# Patient Record
Sex: MALE | Race: WHITE | URBAN - METROPOLITAN AREA
[De-identification: names, ages, dates, MRNs, and addresses within clinical notes are randomized per-mention and may not be internally consistent; named-entity substitution may affect disease eponyms.]

---

## 2017-01-10 ENCOUNTER — HISTORICAL (OUTPATIENT)
Dept: RADIATION THERAPY | Facility: HOSPITAL | Age: 62
End: 2017-01-10

## 2017-07-11 ENCOUNTER — HISTORICAL (OUTPATIENT)
Dept: RADIATION THERAPY | Facility: HOSPITAL | Age: 62
End: 2017-07-11

## 2018-07-12 ENCOUNTER — HISTORICAL (OUTPATIENT)
Dept: RADIATION THERAPY | Facility: HOSPITAL | Age: 63
End: 2018-07-12

## 2019-06-21 ENCOUNTER — HOSPITAL ENCOUNTER (OUTPATIENT)
Dept: MEDSURG UNIT | Facility: HOSPITAL | Age: 64
End: 2019-06-22
Attending: INTERNAL MEDICINE | Admitting: INTERNAL MEDICINE

## 2019-06-21 LAB
ABS NEUT (OLG): 3.74 X10(3)/MCL (ref 2.1–9.2)
ALBUMIN SERPL-MCNC: 3.5 GM/DL (ref 3.4–5)
ALBUMIN/GLOB SERPL: 1.1 {RATIO}
ALP SERPL-CCNC: 103 UNIT/L (ref 50–136)
ALT SERPL-CCNC: 35 UNIT/L (ref 12–78)
AMPHET UR QL SCN: NORMAL
ANISOCYTOSIS BLD QL SMEAR: 2
AST SERPL-CCNC: 20 UNIT/L (ref 15–37)
BARBITURATE SCN PRESENT UR: NORMAL
BASOPHILS # BLD AUTO: 0 X10(3)/MCL (ref 0–0.2)
BASOPHILS NFR BLD AUTO: 1 %
BENZODIAZ UR QL SCN: NORMAL
BILIRUB SERPL-MCNC: 0.5 MG/DL (ref 0.2–1)
BILIRUBIN DIRECT+TOT PNL SERPL-MCNC: 0.1 MG/DL (ref 0–0.2)
BILIRUBIN DIRECT+TOT PNL SERPL-MCNC: 0.4 MG/DL (ref 0–0.8)
BUN SERPL-MCNC: 21 MG/DL (ref 7–18)
BURR CELLS BLD QL SMEAR: 2
CALCIUM SERPL-MCNC: 9.1 MG/DL (ref 8.5–10.1)
CANNABINOIDS UR QL SCN: NORMAL
CHLORIDE SERPL-SCNC: 110 MMOL/L (ref 98–107)
CK MB SERPL-MCNC: 1.7 NG/ML (ref 0.5–3.6)
CK SERPL-CCNC: 80 UNIT/L (ref 39–308)
CK SERPL-CCNC: 93 UNIT/L (ref 39–308)
CO2 SERPL-SCNC: 23 MMOL/L (ref 21–32)
COCAINE UR QL SCN: NORMAL
CREAT SERPL-MCNC: 1.08 MG/DL (ref 0.7–1.3)
EOSINOPHIL # BLD AUTO: 0.2 X10(3)/MCL (ref 0–0.9)
EOSINOPHIL NFR BLD AUTO: 3 %
ERYTHROCYTE [DISTWIDTH] IN BLOOD BY AUTOMATED COUNT: 15.9 % (ref 11.5–17)
GLOBULIN SER-MCNC: 3.1 GM/DL (ref 2.4–3.5)
GLUCOSE SERPL-MCNC: 101 MG/DL (ref 74–106)
HCT VFR BLD AUTO: 39.3 % (ref 42–52)
HGB BLD-MCNC: 11.6 GM/DL (ref 14–18)
LYMPHOCYTES # BLD AUTO: 2 X10(3)/MCL (ref 0.6–4.6)
LYMPHOCYTES NFR BLD AUTO: 31 % (ref 13–40)
MCH RBC QN AUTO: 21.8 PG (ref 27–31)
MCHC RBC AUTO-ENTMCNC: 29.5 GM/DL (ref 33–36)
MCV RBC AUTO: 73.9 FL (ref 80–94)
MICROCYTES BLD QL SMEAR: 2
MONOCYTES # BLD AUTO: 0.5 X10(3)/MCL (ref 0.1–1.3)
MONOCYTES NFR BLD AUTO: 8 %
NEUTROPHILS # BLD AUTO: 3.74 X10(3)/MCL (ref 2.1–9.2)
NEUTROPHILS NFR BLD AUTO: 57 %
OPIATES UR QL SCN: NORMAL
OVALOCYTES BLD QL SMEAR: 1
PCP UR QL: NORMAL
PH UR STRIP.AUTO: 6.5 [PH] (ref 5–7.5)
PLATELET # BLD AUTO: 256 X10(3)/MCL (ref 130–400)
PLATELET # BLD EST: ABNORMAL 10*3/UL
PMV BLD AUTO: 10.7 FL (ref 7.4–10.4)
POIKILOCYTOSIS BLD QL SMEAR: 3
POTASSIUM SERPL-SCNC: 3.7 MMOL/L (ref 3.5–5.1)
PROT SERPL-MCNC: 6.6 GM/DL (ref 6.4–8.2)
RBC # BLD AUTO: 5.32 X10(6)/MCL (ref 4.7–6.1)
SCHISTOCYTES BLD QL AUTO: 1
SODIUM SERPL-SCNC: 142 MMOL/L (ref 136–145)
SP GR FLD REFRACTOMETRY: 1.01 (ref 1–1.03)
TROPONIN I SERPL-MCNC: <0.02 NG/ML (ref 0.02–0.49)
TROPONIN I SERPL-MCNC: <0.02 NG/ML (ref 0.02–0.49)
WBC # SPEC AUTO: 6.6 X10(3)/MCL (ref 4.5–11.5)

## 2019-06-22 LAB
ABS NEUT (OLG): 3.24 X10(3)/MCL (ref 2.1–9.2)
BASOPHILS # BLD AUTO: 0 X10(3)/MCL (ref 0–0.2)
BASOPHILS NFR BLD AUTO: 1 %
BUN SERPL-MCNC: 14 MG/DL (ref 7–18)
CALCIUM SERPL-MCNC: 8.8 MG/DL (ref 8.5–10.1)
CHLORIDE SERPL-SCNC: 113 MMOL/L (ref 98–107)
CK MB SERPL-MCNC: 1.2 NG/ML (ref 0.5–3.6)
CK SERPL-CCNC: 81 UNIT/L (ref 39–308)
CO2 SERPL-SCNC: 23 MMOL/L (ref 21–32)
CREAT SERPL-MCNC: 0.96 MG/DL (ref 0.7–1.3)
CREAT/UREA NIT SERPL: 14.6
CRP SERPL HS-MCNC: 1.95 MG/L (ref 0–3)
EOSINOPHIL # BLD AUTO: 0.2 X10(3)/MCL (ref 0–0.9)
EOSINOPHIL NFR BLD AUTO: 4 %
ERYTHROCYTE [DISTWIDTH] IN BLOOD BY AUTOMATED COUNT: 15.9 % (ref 11.5–17)
ERYTHROCYTE [SEDIMENTATION RATE] IN BLOOD: 3 MM/HR (ref 0–15)
FERRITIN SERPL-MCNC: 9.8 NG/ML (ref 8–388)
FOLATE SERPL-MCNC: 48.2 NG/ML (ref 3.1–17.5)
GLUCOSE SERPL-MCNC: 86 MG/DL (ref 74–106)
HCT VFR BLD AUTO: 38.3 % (ref 42–52)
HGB BLD-MCNC: 10.9 GM/DL (ref 14–18)
IRON SATN MFR SERPL: 6.4 % (ref 20–50)
IRON SERPL-MCNC: 27 MCG/DL (ref 50–175)
LYMPHOCYTES # BLD AUTO: 2 X10(3)/MCL (ref 0.6–4.6)
LYMPHOCYTES NFR BLD AUTO: 33 %
MCH RBC QN AUTO: 21.5 PG (ref 27–31)
MCHC RBC AUTO-ENTMCNC: 28.5 GM/DL (ref 33–36)
MCV RBC AUTO: 75.5 FL (ref 80–94)
MONOCYTES # BLD AUTO: 0.5 X10(3)/MCL (ref 0.1–1.3)
MONOCYTES NFR BLD AUTO: 8 %
NEUTROPHILS # BLD AUTO: 3.24 X10(3)/MCL (ref 2.1–9.2)
NEUTROPHILS NFR BLD AUTO: 54 %
PLATELET # BLD AUTO: 241 X10(3)/MCL (ref 130–400)
PMV BLD AUTO: 10.5 FL (ref 9.4–12.4)
POTASSIUM SERPL-SCNC: 4.2 MMOL/L (ref 3.5–5.1)
RBC # BLD AUTO: 5.07 X10(6)/MCL (ref 4.7–6.1)
SODIUM SERPL-SCNC: 142 MMOL/L (ref 136–145)
TIBC SERPL-MCNC: 422 MCG/DL (ref 250–450)
TRANSFERRIN SERPL-MCNC: 294 MG/DL (ref 200–360)
TROPONIN I SERPL-MCNC: <0.02 NG/ML (ref 0.02–0.49)
TROPONIN I SERPL-MCNC: <0.02 NG/ML (ref 0.02–0.49)
VIT B12 SERPL-MCNC: 3618 PG/ML (ref 193–986)
WBC # SPEC AUTO: 6 X10(3)/MCL (ref 4.5–11.5)

## 2022-04-30 NOTE — ED PROVIDER NOTES
"   Patient:   Gualberto Hammer            MRN: [id number]            FIN: [id number]               Age:   63 years     Sex:  Male     :  1955   Associated Diagnoses:   Acute dyspnea; Paresthesias; General weakness   Author:   Leanne MARTINEZ, Jhonny GUERRERO      Basic Information   Time seen: Date & time 2019 11:18:00.   History source: Patient.   Arrival mode: Private vehicle, walking.   History limitation: None.   Additional information: Patient's physician(s): Erica Nunez, Chief Complaint from Nursing Triage Note : Chief Complaint   2019 11:16 CDT      Chief Complaint           pt c/o SOB and numbness to BUE x 1 hr; HX of blood clots.  .      History of Present Illness   The patient presents with difficulty breathing and     I, Dr. Perdomo, assumed care of this patient at 1452.    64 y/o CM with a hx of CA, DVT, and HLD presents to the ED c/o SOB. Pt was at work today when he "began feeling funny." He states that he began feeling bi UE and LE tingling with no pain. He then began to feel SOB, lightheaded, and weak.  He sat down and then was unable to get back up.  Pt was unable to drive to hospital and had a coworker bring him.  Did not have any chest pain.  Wife reports that he told his coworker that he "felt like he wasn't given a make it."He reports that he is active, running 5-8 miles per week. Per pt's wife, he had a long flight earlier this week and was recently treated with steroids and abx. Pt had previous DVT following achilles tendon reconstruction sx. no history of panic attacks or anxiety.  The onset was just prior to arrival.  The course/duration of symptoms is constant.  Degree at onset moderate.  Degree at present moderate.  The Exacerbating factors is none.  The Relieving factors is none.  Risk factors consist of deep vein thrombosis and recent travel.  Prior episodes: none.  Therapy today: none.  Associated symptoms: headache, denies chest pain and denies nausea.        Review " of Systems   Constitutional symptoms:  Weakness.   Skin symptoms:  Negative except as documented in HPI   Eye symptoms:  Negative except as documented in HPI   ENMT symptoms:  Negative except as documented in HPI.   Respiratory symptoms:  Shortness of breath.   Cardiovascular symptoms:  No chest pain   Gastrointestinal symptoms:  No nausea,    Genitourinary symptoms:  Negative except as documented in HPI.   Musculoskeletal symptoms:  Negative except as documented in HPI.   Neurologic symptoms:  Headache, dizziness, tingling.       Health Status   Allergies: No known allergies.   Medications:  (Selected)   Prescriptions  Prescribed  Flonase 50 mcg/inh nasal spray: 1 spray(s), Nasal, BID, # 1 bottle(s), 0 Refill(s), [facility]  Documented Medications  Documented  Lipitor 20 mg oral tablet: 20 mg = 1 tab(s), Oral, At Bedtime  aspirin 81 mg oral tablet, CHEWABLE: 81 mg = 1 tab(s), Oral, Daily, # 30 tab(s), 0 Refill(s)  folic acid 1 mg oral tablet: 1 mg = 1 tab(s), Oral, Daily, # 30 tab(s), 0 Refill(s)  multivitamin with minerals (Adult Tab): 1 tab(s), Daily, 0 Refill(s)  omega-3 polyunsaturated fatty acids (Fish Oil 1000mg Cap): Oral, BID, 0 Refill(s).      Past Medical/ Family/ Social History   Medical history:    Resolved  sinus allergies (6743861491):  Resolved.  Dyslipidemia (272.4):  Resolved.  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   Surgical history:    sinus surgery and tumor removed from parotid gland on 11/7/2012 at 57 Years.  Lt achilles tendon repair.  rt knee scope..   Family history:    CABG - Coronary artery bypass graft  Father  Lung cancer  Mother  .   Social history:    Social & Psychosocial Habits    Alcohol  11/18/2012 Risk Assessment: Denies Alcohol Use    Employment/School  11/18/2012 Risk Assessment: No Risk    Exercise  11/18/2012 Risk Assessment: Occasional exercise    Home/Environment  11/18/2012 Risk Assessment: No  Risk    11/18/2012  Lives with: Spouse    Living situation: Home/Independent    Alcohol abuse in household: No    Substance abuse in household: No    Smoker in household: No    Injuries/Abuse/Neglect in household: No    Feels unsafe at home: No    Safe place to go: Yes    Agency(s)/Others notified: No    Family/Friends available to help: Yes    Concern for family members at home: No    Major illness in household: Yes    Financial concerns: No    Concerns over TV/Computer/Game use: No    Nutrition/Health  11/18/2012 Risk Assessment: Low Risk    11/18/2012  Type of diet: Regular    Wants to lose weight: No    Sleeping concerns: No    Feels highly stressed: No    Comment: will be starting radiation - 11/18/2012 15:54 - Jack BRYANT, Hillary BOSE    Other  11/18/2012 Risk Assessment: No Risk    Sexual  11/18/2012 Risk Assessment: No Risk    Substance Use  11/18/2012 Risk Assessment: Denies Substance Abuse    Tobacco  11/18/2012 Risk Assessment: Denies Tobacco Use    01/14/2015  Use: Never smoker    06/21/2019  Use: Never (less than 100 in l    Patient Wants Consult For Cessation Counseling N/A    Abuse/Neglect  06/21/2019  SHX Any signs of abuse or neglect No, Alcohol use: Denies, Tobacco use: Denies, Drug use: Denies.      Physical Examination               Vital Signs   Vital Signs   6/21/2019 11:16 CDT      Temperature Oral          36.5 DegC                             Temperature Oral (calculated)             97.70 DegF                             Peripheral Pulse Rate     86 bpm                             SpO2                      99 %                             Oxygen Therapy            Room air                             Systolic Blood Pressure   169 mmHg  HI                             Diastolic Blood Pressure  95 mmHg  HI  .   Measurements   6/21/2019 11:16 CDT      Weight Dosing             97.5 kg                             Weight Measured and Calculated in Lbs     214.95 lb                              Weight Estimated          97.5 kg                             Height/Length Dosing      182 cm                             Height/Length Estimated   182 cm                             Body Mass Index Estimated 29.43 kg/m2  .   Basic Oxygen Information   6/21/2019 11:16 CDT      SpO2                      99 %                             Oxygen Therapy            Room air  .   General:  Alert, no acute distress   Skin:  Warm, dry, intact   Head:  Normocephalic, atraumatic   Eye:  Pupils are equal, round and reactive to light, extraocular movements are intact, normal conjunctiva.    Cardiovascular:  Regular rate and rhythm, Normal peripheral perfusion, No edema.    Respiratory:  Lungs are clear to auscultation, respirations are non-labored, breath sounds are equal, Symmetrical chest wall expansion.    Gastrointestinal:  Soft, Nontender, Non distended, Normal bowel sounds, Guarding: Negative, Rebound: Negative.    Musculoskeletal:  No deformity.   Neurological:  Alert and oriented to person, place, time, and situation, No focal neurological deficit observed, normal speech observed, Normal handgrip b/l.  5/5 f/e b/l UEs. normal light touch sensation in radial ulnar and median distributions of the b/l UE    Normal hip flexion on the bilateral LEs.  5/5 f/e of the b/l ankles and great toes.  Normal light touch sensation in the distal b/l LEs cranial nerves II through XII are intact except for a left-sided ptosis which is chronic.    Psychiatric:  Cooperative, appropriate mood & affect.       Medical Decision Making   Differential Diagnosis:  Pulmonary embolism, upper respiratory infection, sinusitis.    Rationale:  History of sinusitis which could be causing the shortness of breath and lightheadedness dizziness that experienced atypical chest pain was certainly a consideration given the air hunger and generalized weakness.  EKG and troponin are unremarkable.  Plan to admit for repeat cardiac enzymes as well as an  echocardiogram.  He had a history of previous PE as well as a history of cancer and a recent air travel CT angiogram obtained to rule out pulmonary embolism and it was negative..   Documents reviewed:  Emergency department nurses' notes, emergency department records.    Orders  Launch Order Profile (Selected)   Inpatient Orders  Ordered  Saline Lock Insert: 06/21/19 11:22:00 CDT, Stop date 06/21/19 11:22:00 CDT  Ordered (Exam Completed)  CT Angio Chest PE W Contrast: Stat, 06/21/19 14:54:00 CDT, Pulmonary Embolism, None, Ambulatory, Rad Type, Schedule this test, [facility], [facility], 06/21/19 14:54:00 CDT  Completed  Automated Diff: Now collect, 06/21/19 11:56:00 CDT, Blood, Collected, Stop date 06/21/19 11:56:00 CDT, Lab Collect, Print Label By Order Location, 06/21/19 11:22:00 CDT  Blood Smear Microscopic Exam: Now collect, 06/21/19 11:56:00 CDT, Blood, Collected, Stop date 06/21/19 11:56:00 CDT, Lab Collect, Print Label By Order Location, 06/21/19 11:22:00 CDT  CBC w/ Auto Diff: Now collect, 06/21/19 11:22:00 CDT, Blood, Stop date 06/21/19 11:22:00 CDT, Lab Collect, Print Label By Order Location, 06/21/19 11:22:00 CDT  CMP: Stat collect, 06/21/19 11:22:00 CDT, Blood, Stop date 06/21/19 11:22:00 CDT, Lab Collect, Print Label By Order Location, 06/21/19 11:22:00 CDT  CPK: Stat collect, 06/21/19 11:22:00 CDT, Blood, Stop date 06/21/19 11:22:00 CDT, Lab Collect, Print Label By Order Location, 06/21/19 11:22:00 CDT  CT Head W/O Contrast: Stat, 06/21/19 14:55:00 CDT, Dizziness , vertigo, None, Stretcher, Rad Type, Schedule this test, 06/21/19 14:55:00 CDT  EKG Adult 12 Lead: 06/21/19 11:22:00 CDT, Stat, Once, 06/21/19 11:22:00 CDT, -1, -1, 06/21/19 11:22:00 CDT  Estimated Glomerular Filtration Rate: Stat collect, 06/21/19 11:56:00 CDT, Blood, Collected, Stop date 06/21/19 11:56:00 CDT, Lab Collect, Print Label By Order Location, 06/21/19 11:22:00 CDT  Troponin-I: Stat collect, 06/21/19 11:22:00 CDT,  Blood, Stop date 06/21/19 11:22:00 CDT, Lab Collect, Print Label By Order Location, 06/21/19 11:22:00 CDT  XR Chest 2 Views: Stat, 06/21/19 11:22:00 CDT, Dyspnea, None, Stretcher, Rad Type, Not Scheduled, 06/21/19 11:22:00 CDT  XR Spine Cervical 2 or 3 Views: Stat, 06/21/19 11:23:00 CDT, Radiculopathy cervical, None, Stretcher, Rad Type, Not Scheduled, 06/21/19 11:23:00 CDT  iopamidol: form: Soln, IV, AdHoc, first dose 06/21/19 15:43:39 CDT, stop date 06/21/19 15:43:39 CDT  Deleted  iopamidol: form: Soln, IV, AdHoc, first dose 06/21/19 15:43:39 CDT, stop date 06/21/19 15:43:39 CDT.   Electrocardiogram:  Time 6/21/2019 14:52:00, rate 84, normal sinus rhythm, No ST-T changes, no ectopy, normal VT & QRS intervals, EP Interp.    Results review:  Lab results : Lab View   6/21/2019 11:56 CDT      Sodium Lvl                142 mmol/L                             Potassium Lvl             3.7 mmol/L                             Chloride                  110 mmol/L  HI                             CO2                       23.0 mmol/L                             Calcium Lvl               9.1 mg/dL                             Glucose Lvl               101 mg/dL                             BUN                       21.0 mg/dL  HI                             Creatinine                1.08 mg/dL                             eGFR-AA                   >60 mL/min/1.73 m2  NA                             eGFR-PRISCILA                  >60 mL/min/1.73 m2  NA                             Bili Total                0.5 mg/dL                             Bili Direct               0.10 mg/dL                             Bili Indirect             0.40 mg/dL                             AST                       20 unit/L                             ALT                       35 unit/L                             Alk Phos                  103 unit/L                             Total Protein             6.6 gm/dL                             Albumin Lvl                3.50 gm/dL                             Globulin                  3.10 gm/dL                             A/G Ratio                 1.1  NA                             Total CK                  93 unit/L                             Troponin-I                <0.02 ng/mL                             WBC                       6.6 x10(3)/mcL                             RBC                       5.32 x10(6)/mcL                             Hgb                       11.6 gm/dL  LOW                             Hct                       39.3 %  LOW                             Platelet                  256 x10(3)/mcL                             MCV                       73.9 fL  LOW                             MCH                       21.8 pg  LOW                             MCHC                      29.5 gm/dL  LOW                             RDW                       15.9 %                             MPV                       10.7 fL                             Abs Neut                  3.74 x10(3)/mcL                             Neutro Auto               57 %  NA                             Lymph Auto                31 %  NA                             Mono Auto                 8 %  NA                             Eos Auto                  3 %  NA                             Abs Eos                   0.2 x10(3)/mcL                             Basophil Auto             1 %  NA                             Abs Neutro                3.74 x10(3)/mcL                             Abs Lymph                 2.0 x10(3)/mcL                             Abs Mono                  0.5 x10(3)/mcL                             Abs Baso                  0.0 x10(3)/mcL                             Platelet Est              Decreased                             Anisocyte                 2  HI                             Poik                      3  HI                             Microcyte                 2  HI                              Schistocyte               1  HI                             Elliptocytosis            2  HI                             Ovalocytes                1                             Pola Cells                2  .   Radiology results:  Rad Results (ST)  < 12 hrs   Accession: [id number]  Order: CT Head W/O Contrast  Report Dt/Tm: 06/21/2019 15:52  Report:      Clinical History:  Dizziness and vertigo     Reference:  24 June 2013      Technique:  CT imaging of the head performed from the skull base to the vertex  without intravenous contrast. DLP 1186 mGycm. Automatic exposure  control, adjustment of mA/kV or iterative reconstruction technique was  used to reduce radiation.     Findings:     There is no acute cortical infarct, hemorrhage or mass lesion. The  ventricles are normal in size.      Visualized paranasal sinuses and mastoid air cells are clear.     Impression:  No acute intracranial findings.      Accession: [id number]  Order: XR Spine Cervical 2 or 3 Views  Report Dt/Tm: 06/21/2019 12:18  Report:   XR Spine Cervical 2 or 3 Views     HISTORY: Radiculopathy cervical     COMPARISON: None.     FINDINGS:  The cervical spine is visualized from the skull base to C7.     No acute displaced fractures, compression deformities or subluxations.  Straightening of the normal lordosis.  Mild disc space narrowing from C3 to C5, moderate from C5 to C7.  No blastic or lytic lesions.  Soft tissues within normal limits.  Lung apices clear.     IMPRESSION:     No acute osseous abnormality.           Accession: [id number]  Order: XR Chest 2 Views  Report Dt/Tm: 06/21/2019 11:55  Report:      History:  Short of breath     Reference:  28 January 2015     Findings:  PA and lateral views of the chest were obtained. Heart and mediastinum  within normal limits. The lungs are clear. No pneumothorax or  significant effusion.     Impression:   No acute cardiopulmonary abnormality.    .      Impression and Plan   Diagnosis   Acute dyspnea  (YTI13-TX R06.00)   Paresthesias (UNX45-XS R20.2)   General weakness (BDB81-IL R53.1)      Calls-Consults   -  Luli BAKER MD, Elijah CONDE, recommends Admit to internal medicine.  Echocardiogram in the morning.    -  Suzanne JACOBS , Anna GUERRERO   Plan   Counseled: Patient, Family, Regarding diagnosis, Regarding diagnostic results, Regarding treatment plan, Patient indicated understanding of instructions.    Notes: I, Dr Perdomo have read note from scribe (Anup Finney) and I agree with history and physical except as amended by me.  All information was dictated from my history and my examination of patient..

## 2022-04-30 NOTE — CONSULTS
Patient:   Gualberto Hammer            MRN: [id number]            FIN: [id number]               Age:   63 years     Sex:  Male     :  1955   Associated Diagnoses:   None   Author:   Luli BAKER MD, Elijah CONDE      Chief Complaint   Palpitations      History of Present Illness   63-year-old male with history of neck radiation, DVT, and hypertension admitted for further evaluation of acute onset dizziness and palpitations.  Patient was sitting in his office may develop palpitations and bilateral hand tingling.  He reports he felt lightheaded during this episode but did not pass out.  Patient is chest pain, prior significant, presyncope.  He does report dyspnea on exertion which has been progressively.  In the emergency department EKG was obtained without ST changes.  Cardiac markers are negative. CTA neg for pe Of note patient was anemic but denies bloody stools.  He does report he is due for a surveillance colonoscopy.      Review of Systems   Constitutional:  Negative.    Eye:  Negative.    Ear/Nose/Mouth/Throat:  Negative.    Respiratory:  Negative except as documented in history of present illness.    Cardiovascular:  Negative except as documented in history of present illness.    Gastrointestinal:  Negative.    Genitourinary:  Negative.    Hematology/Lymphatics:  Negative.    Endocrine:  Negative.    Immunologic:  Negative.    Musculoskeletal:  Negative.    Integumentary:  Negative.    Neurologic:  Negative.    Psychiatric:  Negative.       Health Status   Allergies:    Allergic Reactions (All)  Severity Not Documented  Lactose- Unknown.  Canceled/Inactive Reactions (All)  No Known Allergies   Current medications:  (Selected)   Inpatient Medications  Ordered  Lipitor: 20 mg, form: Tab, Oral, At Bedtime, first dose 19 21:00:00 CDT  Zofran: 4 mg, form: Injection, IV Push, q4hr PRN for nausea, first dose 19 20:44:00 CDT  acetaminophen: 1,000 mg, form: Tab, Oral, q6hr PRN for pain, mild, first dose  06/21/19 20:44:00 CDT  acetaminophen: 650 mg, form: Liquid, Oral, q6hr PRN for fever, first dose 06/21/19 20:44:00 CDT,  > 38.1 degrees Celsius (100.6 degrees Fahrenheit)  aspirin 81 mg oral tablet, CHEWABLE: 81 mg, form: Tab-Chew, Oral, Daily, first dose 06/22/19 9:00:00 CDT  ferrous sulfate 325 mg (65 mg elemental iron) oral enteric coated tablet: 325 mg, form: Tab, Oral, BID, first dose 06/22/19 7:11:00 CDT  folic acid 1 mg oral tablet: 1 mg, form: Tab, Oral, Daily, first dose 06/22/19 9:00:00 CDT  lisinopril 10 mg oral tablet: 10 mg, form: Tab, Oral, Daily, first dose 06/22/19 9:00:00 CDT  multivitamin with minerals (Adult Tab): 1 tab(s), form: Tab, Oral, Daily, first dose 06/21/19 21:52:00 CDT  omega-3 polyunsaturated fatty acids (Fish Oil 1000mg Cap): 1 cap(s), 1,000 mg =, form: Cap, Oral, BID, first dose 06/21/19 21:52:00 CDT  Prescriptions  Prescribed  Flonase 50 mcg/inh nasal spray: 1 spray(s), Nasal, BID, # 1 bottle(s), 0 Refill(s), Pharmacy: [facility]  Documented Medications  Documented  Amoxil 875 mg oral tablet: 875 mg = 1 tab(s), Oral, BID  Lipitor 20 mg oral tablet: 20 mg = 1 tab(s), Oral, At Bedtime  aspirin 81 mg oral tablet, CHEWABLE: 81 mg = 1 tab(s), Oral, Daily, # 30 tab(s), 0 Refill(s)  folic acid 1 mg oral tablet: 1 mg = 1 tab(s), Oral, Daily, # 30 tab(s), 0 Refill(s)  indomethacin 50 mg oral capsule: 50 mg = 1 cap(s), Oral, TID  multivitamin with minerals (Adult Tab): 1 tab(s), Daily, 0 Refill(s)  omega-3 polyunsaturated fatty acids (Fish Oil 1000mg Cap): Oral, BID, 0 Refill(s)  prednisONE 20 mg oral tablet: 40 mg = 2 tab(s), Oral, Daily   Problem list:    All Problems  Hyperlipidaemia / SNOMED CT 135965634 / Confirmed  Obesity / SNOMED CT 5844109382 / Probable      Histories   Past Medical History:    Resolved  Dyslipidemia (272.4):  Resolved.  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  sinus allergies ([id number]):  Resolved.      Physical Examination    Intake and Output   Fluid Balance Primitives   6/21/2019 15:00 CDT      Oral Intake               280 mL                             Urine Count               6                             Stool Count               0        General:  Alert and oriented.    Eye:  Pupils are equal, round and reactive to light.    Neck:  Supple, Non-tender.    Respiratory:  Lungs are clear to auscultation, Respirations are non-labored.    Cardiovascular:  Normal rate, Regular rhythm.    Gastrointestinal:  Soft, Non-tender.    Vital Signs   6/21/2019 11:16 CDT      Temperature Oral          36.5 DegC                             Temperature Oral (calculated)             97.70 DegF                             Peripheral Pulse Rate     86 bpm                             SpO2                      99 %                             Oxygen Therapy            Room air                             Systolic Blood Pressure   169 mmHg  HI                             Diastolic Blood Pressure  95 mmHg  HI     Genitourinary:  No costovertebral angle tenderness.    Lymphatics:  No lymphadenopathy neck, axilla, groin.    Musculoskeletal:  Normal range of motion, Normal strength.    Integumentary:  Warm, Dry, Pink.    Cognition and Speech:  Oriented.       Review / Management   Results review:     Labs (Last four charted values)  WBC                  6.0 (JUN 22) 6.6 (JUN 21)   Hgb                  L 10.9 (JUN 22) L 11.6 (JUN 21)   Hct                  L 38.3 (JUN 22) L 39.3 (JUN 21)   Plt                  241 (JUN 22) 256 (JUN 21)   Na                   142 (JUN 22) 142 (JUN 21)   K                    4.2 (JUN 22) 3.7 (JUN 21)   CO2                  23.0 (JUN 22) 23.0 (JUN 21)   Cl                   H 113 (JUN 22) H 110 (JUN 21)   Cr                   0.96 (JUN 22) 1.08 (JUN 21)   BUN                  14.0 (JUN 22) H 21.0 (JUN 21)   Glucose Random       86 (JUN 22) 101 (JUN 21) .         Interpretation: Reviewed all LAbs and CTA..    EKG: Normal sinus  rhythm, no acute ST changes      Impression and Plan   1.  Palpitations  2.  Presyncope  3.  History of neck radiation  4.  Iron deficiency anemia  5.  Hypertension  6.  Hyperlipidemia    Follow-up echocardiogram when available.  Continue telemetry monitoring while admitted.  Once patient is discharged, can call our office on Monday to arrange two-week event monitor.  Plan for bilateral carotid ultrasound and exercise treadmill test on follow-up.  Start iron supplementation.  Close outpatient follow for colonoscopy as patient is due for 5 year surveillance colonoscopy.  Agree with lisinopril for blood pressure control.  Okay for discharge home with close follow-up in clinic next week with echocardiogram reviewed if okay with primary team.

## 2022-05-04 NOTE — HISTORICAL [facility]
This is a historical note converted from Dorothy. Formatting and pictures may have been removed.  Please reference Dorothy for original formatting and attached multimedia. Chief Complaint  Palpitation,?bilateral upper extremity numbness?for 30?minutes  History of Present Illness  This is a 63-year-old male?presented to the emergency room with chief complaint of?palpitation?and?shortness of breath?associated with bilateral?upper extremity?tingling/numbness sensation. Report?that he was?at work sitting in?his office?and suddenly?he started to feel?his heart is racing?followed by?shortness of breath?and?tingling sensation in his bilateral?hands/arms. ?Denies chest pain,?lightheadedness,?nausea,?or vomiting.??No similar prior episodes.??No prior history of?heart disease or dysrhythmias.? He is a non-smoker,?has a?family history?of premature?CAD?in his father?at age 52.?Recently was diagnosed with?sinusitis?and hip arthritis?for which?he was getting?prednisone,?amoxicillin?and?articular steroid injections. ?Report that he regularly?donates?blood?every?4 months. ?Denies?melena or?hematochezia or weight loss. ?On arrival to ED, /95, HR 86, afebrile.? EKG NSR with no ST-T wave changes.? Labs significant for microcytic anemia Hb 11.6, normal troponin I. CT chest negative for PE,CT Head with no acute pathology.? Cardiology consulted by ED. Referred to hospitalist medicine service?for further evaluation and management.  Review of Systems  Except as documented, all other systems reviewed and are negative.  Physical Exam  Vitals & Measurements  T:?36.6? ?C (Oral)? TMIN:?36.5? ?C (Oral)? TMAX:?36.6? ?C (Oral)? HR:?73(Peripheral)? RR:?22? BP:?148/82? SpO2:?99%? WT:?97.5?kg?  General: Appears comfortable in bed  HEENT:?NC/AT, EOMI, PERRLA, normal conjunctiva, throat clear  Neck:?No JVD, trachea at?midline  Chest:?CTABL, no rales or rhonchi, no accessory muscle use  CVS:?Regular rhythm. Normal S1/S2. No gallops, murmurs or rub.  No carotids bruit. Palpable?peripheral?pulses  Abdomen:?Nondistended, normoactive bowel sound, soft and non-tender.  Lymph:?no cervical, supraclavicular lymphadenopathy  Extremities:?no clubbing or cyanosis  Skin:?Warm and dry, no rashes or lesions  Neuro:?AAOx3, no focal neurological deficit  Psych:?Cooperative  Assessment/Plan  Panic attack Vs arrhythmia/PSVT?  Microcytic anemia  Hypertension  Hyperlipidemia  ?   Plan:  ?  -TTE, telemetry, might need Holter?or event recorder, appreciate cardiology input  -Ferritin, iron profile  -Start lisinopril?10 mg daily  -Continue home meds  ?  ?  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  Hyperlipidemia  Homozygous MTHFR gene mutation  Had 2 colonoscopies, last 6 years ago with few polyps removed and scheduled for for 5 years survivance  Procedure/Surgical History  sinus surgery and tumor removed from parotid gland (11/07/2012)  Lt achilles tendon repair  rt knee scope   Medications  Inpatient  acetaminophen, 1000 mg= 2 tab(s), Oral, q6hr, PRN  acetaminophen, 650 mg= 20.3 mL, Oral, q6hr, PRN  aspirin 81 mg oral tablet, CHEWABLE, 81 mg= 1 tab(s), Oral, Daily  folic acid 1 mg oral tablet, 1 mg= 1 tab(s), Oral, Daily  Lipitor, 20 mg= 2 tab(s), Oral, At Bedtime  lisinopril 10 mg oral tablet, 10 mg= 1 tab(s), Oral, Daily  [id number] 1,000 mL, 1000 mL, IV  multivitamin with minerals (Adult Tab), 1 tab(s), Oral, Daily  omega-3 polyunsaturated fatty acids (Fish Oil 1000mg Cap), 1000 mg= 1 cap(s), Oral, BID  Zofran, 4 mg= 2 mL, IV Push, q4hr, PRN  Home  aspirin 81 mg oral tablet, CHEWABLE, 81 mg= 1 tab(s), Oral, Daily  Flonase 50 mcg/inh nasal spray, 1 spray(s), Nasal, BID  folic acid 1 mg oral tablet, 1 mg= 1 tab(s), Oral, Daily  Lipitor 20 mg oral tablet, 20 mg= 1 tab(s), Oral, At  Bedtime  multivitamin with minerals (Adult Tab), 1 tab(s), Daily  omega-3 polyunsaturated fatty acids (Fish Oil 1000mg Cap), Oral, BID  Allergies  No Known Allergies  Social History  No smoking , EtOH, or illicit drugs  Family History  Father had CAD/MI at age 52  Lab Results  Labs Last 24 Hours?  ?Chemistry? Hematology/Coagulation?   Sodium Lvl: 142 mmol/L (06/21/19 11:56:00) WBC: 6.6 x10(3)/mcL (06/21/19 11:56:00)   Potassium Lvl: 3.7 mmol/L (06/21/19 11:56:00) RBC: 5.32 x10(6)/mcL (06/21/19 11:56:00)   Chloride:?110 mmol/L?High (06/21/19 11:56:00) Hgb:?11.6 gm/dL?Low (06/21/19 11:56:00)   CO2: 23 mmol/L (06/21/19 11:56:00) Hct:?39.3 %?Low (06/21/19 11:56:00)   Calcium Lvl: 9.1 mg/dL (06/21/19 11:56:00) Platelet: 256 x10(3)/mcL (06/21/19 11:56:00)   Glucose Lvl: 101 mg/dL (06/21/19 11:56:00) MCV:?73.9 fL?Low (06/21/19 11:56:00)   BUN:?21 mg/dL?High (06/21/19 11:56:00) MCH:?21.8 pg?Low (06/21/19 11:56:00)   Creatinine: 1.08 mg/dL (06/21/19 11:56:00) MCHC:?29.5 gm/dL?Low (06/21/19 11:56:00)   eGFR-AA: >60 (06/21/19 11:56:00) RDW: 15.9 % (06/21/19 11:56:00)   eGFR-PRISCILA: >60 (06/21/19 11:56:00) MPV: 10.7 fL (06/21/19 11:56:00)   Bili Total: 0.5 mg/dL (06/21/19 11:56:00) Abs Neut: 3.74 x10(3)/mcL (06/21/19 11:56:00)   Bili Direct: 0.1 mg/dL (06/21/19 11:56:00) Neutro Auto: 57 % (06/21/19 11:56:00)   Bili Indirect: 0.4 mg/dL (06/21/19 11:56:00) Lymph Auto: 31 % (06/21/19 11:56:00)   AST: 20 unit/L (06/21/19 11:56:00) Mono Auto: 8 % (06/21/19 11:56:00)   ALT: 35 unit/L (06/21/19 11:56:00) Eos Auto: 3 % (06/21/19 11:56:00)   Alk Phos: 103 unit/L (06/21/19 11:56:00) Abs Eos: 0.2 x10(3)/mcL (06/21/19 11:56:00)   Total Protein: 6.6 gm/dL (06/21/19 11:56:00) Basophil Auto: 1 % (06/21/19 11:56:00)   Albumin Lvl: 3.5 gm/dL (06/21/19 11:56:00) Abs Neutro: 3.74 x10(3)/mcL (06/21/19 11:56:00)   Globulin: 3.1 gm/dL (06/21/19 11:56:00) Abs Lymph: 2 x10(3)/mcL (06/21/19 11:56:00)   A/G Ratio: 1.1 (06/21/19 11:56:00) Abs Mono: 0.5  x10(3)/mcL (06/21/19 11:56:00)   Total CK: 80 unit/L (06/21/19 21:08:00) Abs Baso: 0 x10(3)/mcL (06/21/19 11:56:00)   CK MB: 1.7 ng/mL (06/21/19 21:08:00) Platelet Est: I (06/21/19 11:56:00)   Troponin-I: <0.02 (06/21/19 21:08:00) Anisocyte:?2?High (06/21/19 11:56:00)    Poik:?3?High (06/21/19 11:56:00)    Microcyte:?2?High (06/21/19 11:56:00)    Schistocyte:?1?High (06/21/19 11:56:00)    Elliptocytosis:?2?High (06/21/19 11:56:00)    Ovalocytes: 1 Abnormal (06/21/19 11:56:00)    Pola Cells: 2 Abnormal (06/21/19 11:56:00)   Diagnostic Results  Accession:?[id number]  Order:?CT Angio Chest PE W Contrast  Report Dt/Tm:?06/21/2019 15:58  Report:?  EXAMINATION: CT the thorax with contrast; PE protocol  ?  EXAMINATION DATE: 6/21/2019  ?  COMPARISON: Chest 2 views from same day  ?  TECHNIQUE: Multiple cross-sectional images of the thorax were obtained  after the intravenous administration of contrast. Coronal and sagittal  reformatted images were obtained. MIP images were obtained.  ?  CLINICAL HISTORY: Shortness of breath  ?  FINDINGS:  ?  Heart size is within normal limits. Coronary artery calcifications are  identified. No reflux of contrast into the IVC. No shift  interventricular septum. Course and caliber of the thoracic aorta is  normal with a triple vessel arch. The main pulmonary artery is of  normal caliber and adequately opacified. No central or distal filling  defect is identified to suggest pulmonary and lungs. No hilar or  mediastinal adenopathy.  ?  Dependent atelectatic changes lungs without area of consolidation or  pulmonary infarct. No pleural thickening or pleural effusion. The  trachea and airways are patent.  ?  Several hypodensities are identified within the liver which are too  small to characterize. Subcentimeter splenic artery aneurysms  identified measuring 6 mm. No suspicious osseous lesions.?  ?  Degenerative changes of the lumbar spine are identified. Soft tissues  are  normal.  ?  IMPRESSION:?  1. No evidence for pulmonary embolism.  2. 6 mm splenic artery aneurysms identified.  3. Other secondary findings as above.  ?  ?  Accession:?[id number]  Order:?CT Head W/O Contrast  Report Dt/Tm:?06/21/2019 15:52  Report:?  ?  Clinical History:  Dizziness and vertigo  ?  Reference:  24 June 2013?  ?  Technique:  CT imaging of the head performed from the skull base to the vertex  without intravenous contrast. DLP 1186 mGycm. Automatic exposure  control, adjustment of mA/kV or iterative reconstruction technique was  used to reduce radiation.  ?  Findings:  ?  There is no acute cortical infarct, hemorrhage or mass lesion. The  ventricles are normal in size.?  ?  Visualized paranasal sinuses and mastoid air cells are clear.  ?  Impression:  No acute intracranial findings.

## 2022-05-04 NOTE — HISTORICAL [facility]
This is a historical note converted from Dorothy. Formatting and pictures may have been removed.  Please reference Dorothy for original formatting and attached multimedia. Admit and Discharge Dates  Admit Date: 06/21/2019  Discharge Date: 06/22/2019  ?  Physicians  Attending Physician - Noe MARTINEZ, Ning GRISSOM  Attending Physician - Ashley MARTINEZ, Debi  Admitting Physician - Noe MARTINEZ, Ning GRISSOM  Consulting Physician - Luli BAKER MD, Elijah CONDE  Consulting Physician - Jackie MARTINEZ, Zhang CONDE  Primary Care Physician - Simi Edwards MD, FLY Almanza  ?  Discharge Diagnosis  1.?Palpitations?R00.2  2.?Paresthesias?R20.2  3.?Acute dyspnea?R06.00  4.?History of DVT in adulthood?Z86.718  5.?HTN (hypertension)?I10  6.?Microcytic anemia?D50.9  7.?HLD (hyperlipidemia)?E78.5  General weakness?R53.1  Shortness of breath?[id number]  Surgical Procedures  No procedures recorded for this visit.  Immunizations  No immunizations recorded for this visit.  ?  Hospital Course  Mr. Hammer is a 63-year-old male?presented to the emergency room with chief complaint of?palpitation?and?shortness of breath?associated with bilateral?upper extremity?tingling/numbness sensation. Report?that he was?at work sitting in?his office?and suddenly?he started to feel?his heart is racing?followed by?shortness of breath?and?tingling sensation in his bilateral?hands/arms. ?Denies chest pain,?lightheadedness,?nausea,?or vomiting.??No similar prior episodes.??No prior history of?heart disease or dysrhythmias.? He is a non-smoker,?has a?family history?of premature?CAD?in his father?at age 52.?Recently was diagnosed with?sinusitis?and hip arthritis?for which?he was getting?prednisone,?amoxicillin?and?articular steroid injections. ?Report that he regularly?donates?blood?every?4 months. ?Denies?melena or?hematochezia or weight loss. ?On arrival to ED, /95, HR 86, afebrile.? EKG NSR with no ST-T wave changes.? Labs significant for microcytic anemia Hb 11.6,  normal troponin I. CT chest negative for PE,CT Head with no acute pathology.? Cardiology consulted by ED. Referred to hospitalist medicine service?for further evaluation and management. ?He was worked up with serial troponins which were negative.? On telemetry he has no?abnormal rhythm.? Cardiology evaluated the patient?recommends?echo and?outpatient stress test. ?Patient symptomatically improved.? For hypertension he was initiated on?lisinopril.? Cardiology has no further recommendation?as inpatient would like to follow-up as an outpatient.? Patient hemodynamics are improved and asymptomatic will be discharged to home to follow-up with cardiology in 1 week.? He will be continued on oral aspirin, statin,?as needed?anxiolytics.? He had echocardiogram?showing EF of?more than 55% with no significant valvular abnormalities.  Time Spent on discharge  > 31mins  Objective  Vitals & Measurements  T:?36.9? ?C (Oral)? TMIN:?36.4? ?C (Oral)? TMAX:?36.9? ?C (Oral)? HR:?72(Peripheral)? RR:?16? BP:?129/84? SpO2:?97%? WT:?97.5?kg?  Physical Exam  General: Appears comfortable in bed  HEENT:?NC/AT, EOMI, PERRLA, normal conjunctiva, throat clear  Neck:?No JVD, trachea at?midline  Chest:?CTABL, no rales or rhonchi, no accessory muscle use  CVS:?Regular rhythm. Normal S1/S2. No gallops, murmurs or rub. No carotids bruit. Palpable?peripheral?pulses  Abdomen:?Nondistended, normoactive bowel sound, soft and non-tender.  Lymph:?no cervical, supraclavicular lymphadenopathy  Extremities:?no clubbing or cyanosis  Skin:?Warm and dry, no rashes or lesions  Neuro:?AAOx3, no focal neurological deficit  Psych:?Cooperative  Patient Discharge Condition  Improved  Discharge Disposition  Home   Discharge Medication Reconciliation  Prescribed  alPRAzolam (Xanax 0.25 mg oral tablet)?0.25 mg, Oral, BID, PRN as needed for anxiety  ferrous sulfate (ferrous sulfate 325 mg (65 mg elemental iron) oral tablet)?325 mg, Oral, BID  lisinopril (lisinopril 10 mg  oral tablet)?10 mg, Oral, Daily  Continue  aspirin (aspirin 81 mg oral tablet, CHEWABLE)?81 mg, Oral, Daily  atorvastatin (Lipitor 20 mg oral tablet)?20 mg, Oral, At Bedtime  folic acid (folic acid 1 mg oral tablet)?1 mg, Oral, Daily  multivitamin with minerals (multivitamin with minerals (Adult Tab))?1 tab(s), Daily  omega-3 polyunsaturated fatty acids (omega-3 polyunsaturated fatty acids (Fish Oil 1000mg Cap)), Oral, BID  Discontinue  amoxicillin (Amoxil 875 mg oral tablet)?875 mg, Oral, BID  fluticasone nasal (Flonase 50 mcg/inh nasal spray)?1 spray(s), Nasal, BID  indomethacin (indomethacin 50 mg oral capsule)?50 mg, Oral, TID  predniSONE (prednisONE 20 mg oral tablet)?40 mg, Oral, Daily  ?  Education and Orders Provided  Generalized Anxiety Disorder, Adult  Discharge - 06/22/19 8:44:00 CDT, Home, after ECHO completed?  ?  Follow up  F/u with Dr. Rockwell in 1 week  ?